# Patient Record
Sex: FEMALE | URBAN - METROPOLITAN AREA
[De-identification: names, ages, dates, MRNs, and addresses within clinical notes are randomized per-mention and may not be internally consistent; named-entity substitution may affect disease eponyms.]

---

## 2020-04-10 ENCOUNTER — EMERGENCY (EMERGENCY)
Facility: HOSPITAL | Age: 40
LOS: 1 days | Discharge: HOME | End: 2020-04-10
Attending: EMERGENCY MEDICINE
Payer: COMMERCIAL

## 2020-04-10 VITALS
SYSTOLIC BLOOD PRESSURE: 127 MMHG | RESPIRATION RATE: 18 BRPM | HEART RATE: 92 BPM | WEIGHT: 126.99 LBS | OXYGEN SATURATION: 98 % | HEIGHT: 62 IN | TEMPERATURE: 98 F | DIASTOLIC BLOOD PRESSURE: 84 MMHG

## 2020-04-10 PROCEDURE — 99282 EMERGENCY DEPT VISIT SF MDM: CPT

## 2020-04-10 PROCEDURE — 99284 EMERGENCY DEPT VISIT MOD MDM: CPT

## 2020-04-10 NOTE — ED PROVIDER NOTE - OBJECTIVE STATEMENT
40 y/o female with no sign medical hx presents to the ED c/o needle stick to the finger left thumb that occurred 2 hours prior. Pt immediately washed out the area. Source patient was discharge but identified. Pt is up to date with hep B, and tetanus. Does not want post exposure Prophylaxis at this time.

## 2020-04-10 NOTE — ED PROVIDER NOTE - CLINICAL SUMMARY MEDICAL DECISION MAKING FREE TEXT BOX
38 y/o female with no sign medical hx presents to the ED c/o needle stick to the finger that occurred 2 hours prior. pt cleaned out wound prior coming to the ED, up to date with tetanus and hep b. Source patient was identified but was discharge, paper work filled out, pt refusing PEP prophylaxis at this time, advised to follow up with PCP or health services

## 2020-04-10 NOTE — ED PROVIDER NOTE - ATTENDING CONTRIBUTION TO CARE
40yo F p/w needlestick injury, refused PREP, packet filled out and labs sent, [place]. Blanca Antonio DO

## 2020-04-10 NOTE — ED PROVIDER NOTE - PATIENT PORTAL LINK FT
You can access the FollowMyHealth Patient Portal offered by [facility] by registering at the following website: [email or web address]. By joining [facility]’s FollowMyHealth portal, you will also be able to view your health information using other applications (apps) compatible with our system.

## 2020-04-10 NOTE — ED PROVIDER NOTE - CHPI ED SYMPTOMS NEG
no redness/no drainage/no fever/no bleeding/no bleeding at site/no chills/no inflammation/no purulent drainage/no pain/no red streaks

## 2020-04-26 ENCOUNTER — MESSAGE (OUTPATIENT)
Age: 40
End: 2020-04-26

## 2020-04-29 ENCOUNTER — TRANSCRIPTION ENCOUNTER (OUTPATIENT)
Age: 40
End: 2020-04-29

## 2020-04-29 ENCOUNTER — APPOINTMENT (OUTPATIENT)
Dept: DISASTER EMERGENCY | Facility: CLINIC | Age: 40
End: 2020-04-29

## 2020-04-29 PROBLEM — Z00.00 ENCOUNTER FOR PREVENTIVE HEALTH EXAMINATION: Status: ACTIVE | Noted: 2020-04-29

## 2020-04-29 LAB
SARS-COV-2 IGG SERPL IA-ACNC: <0.1 INDEX
SARS-COV-2 IGG SERPL QL IA: NEGATIVE

## 2020-07-20 ENCOUNTER — OUTPATIENT (OUTPATIENT)
Dept: OUTPATIENT SERVICES | Facility: HOSPITAL | Age: 40
LOS: 1 days | End: 2020-07-20
Payer: SELF-PAY

## 2020-07-20 VITALS
OXYGEN SATURATION: 100 % | WEIGHT: 128.09 LBS | HEIGHT: 61 IN | SYSTOLIC BLOOD PRESSURE: 119 MMHG | DIASTOLIC BLOOD PRESSURE: 79 MMHG | RESPIRATION RATE: 16 BRPM | TEMPERATURE: 98 F | HEART RATE: 64 BPM

## 2020-07-20 DIAGNOSIS — Z01.818 ENCOUNTER FOR OTHER PREPROCEDURAL EXAMINATION: ICD-10-CM

## 2020-07-20 DIAGNOSIS — L98.8 OTHER SPECIFIED DISORDERS OF THE SKIN AND SUBCUTANEOUS TISSUE: ICD-10-CM

## 2020-07-20 DIAGNOSIS — Z98.890 OTHER SPECIFIED POSTPROCEDURAL STATES: Chronic | ICD-10-CM

## 2020-07-20 LAB
ALBUMIN SERPL ELPH-MCNC: 4 G/DL — SIGNIFICANT CHANGE UP (ref 3.3–5)
ALP SERPL-CCNC: 40 U/L — SIGNIFICANT CHANGE UP (ref 40–120)
ALT FLD-CCNC: 18 U/L — SIGNIFICANT CHANGE UP (ref 12–78)
ANION GAP SERPL CALC-SCNC: 1 MMOL/L — LOW (ref 5–17)
AST SERPL-CCNC: 14 U/L — LOW (ref 15–37)
BILIRUB SERPL-MCNC: 0.4 MG/DL — SIGNIFICANT CHANGE UP (ref 0.2–1.2)
BUN SERPL-MCNC: 7 MG/DL — SIGNIFICANT CHANGE UP (ref 7–23)
CALCIUM SERPL-MCNC: 9.3 MG/DL — SIGNIFICANT CHANGE UP (ref 8.5–10.1)
CHLORIDE SERPL-SCNC: 108 MMOL/L — SIGNIFICANT CHANGE UP (ref 96–108)
CO2 SERPL-SCNC: 32 MMOL/L — HIGH (ref 22–31)
CREAT SERPL-MCNC: 0.69 MG/DL — SIGNIFICANT CHANGE UP (ref 0.5–1.3)
GLUCOSE SERPL-MCNC: 87 MG/DL — SIGNIFICANT CHANGE UP (ref 70–99)
HCG SERPL-ACNC: <1 MIU/ML — SIGNIFICANT CHANGE UP
HCT VFR BLD CALC: 41 % — SIGNIFICANT CHANGE UP (ref 34.5–45)
HGB BLD-MCNC: 13.7 G/DL — SIGNIFICANT CHANGE UP (ref 11.5–15.5)
MCHC RBC-ENTMCNC: 30.9 PG — SIGNIFICANT CHANGE UP (ref 27–34)
MCHC RBC-ENTMCNC: 33.4 GM/DL — SIGNIFICANT CHANGE UP (ref 32–36)
MCV RBC AUTO: 92.3 FL — SIGNIFICANT CHANGE UP (ref 80–100)
NRBC # BLD: 0 /100 WBCS — SIGNIFICANT CHANGE UP (ref 0–0)
PLATELET # BLD AUTO: 430 K/UL — HIGH (ref 150–400)
POTASSIUM SERPL-MCNC: 4.4 MMOL/L — SIGNIFICANT CHANGE UP (ref 3.5–5.3)
POTASSIUM SERPL-SCNC: 4.4 MMOL/L — SIGNIFICANT CHANGE UP (ref 3.5–5.3)
PROT SERPL-MCNC: 7.7 G/DL — SIGNIFICANT CHANGE UP (ref 6–8.3)
RBC # BLD: 4.44 M/UL — SIGNIFICANT CHANGE UP (ref 3.8–5.2)
RBC # FLD: 12.5 % — SIGNIFICANT CHANGE UP (ref 10.3–14.5)
SODIUM SERPL-SCNC: 141 MMOL/L — SIGNIFICANT CHANGE UP (ref 135–145)
WBC # BLD: 7.89 K/UL — SIGNIFICANT CHANGE UP (ref 3.8–10.5)
WBC # FLD AUTO: 7.89 K/UL — SIGNIFICANT CHANGE UP (ref 3.8–10.5)

## 2020-07-20 PROCEDURE — 86901 BLOOD TYPING SEROLOGIC RH(D): CPT

## 2020-07-20 PROCEDURE — 86900 BLOOD TYPING SEROLOGIC ABO: CPT

## 2020-07-20 PROCEDURE — 85027 COMPLETE CBC AUTOMATED: CPT

## 2020-07-20 PROCEDURE — 86850 RBC ANTIBODY SCREEN: CPT

## 2020-07-20 PROCEDURE — 80053 COMPREHEN METABOLIC PANEL: CPT

## 2020-07-20 PROCEDURE — G0463: CPT

## 2020-07-20 PROCEDURE — 84702 CHORIONIC GONADOTROPIN TEST: CPT

## 2020-07-20 PROCEDURE — 36415 COLL VENOUS BLD VENIPUNCTURE: CPT

## 2020-07-20 NOTE — H&P PST ADULT - NSANTHOSAYNRD_GEN_A_CORE
No. ISIDRO screening performed.  STOP BANG Legend: 0-2 = LOW Risk; 3-4 = INTERMEDIATE Risk; 5-8 = HIGH Risk

## 2020-07-20 NOTE — H&P PST ADULT - ATTENDING COMMENTS
Reviewed all laboratory. Slightly increased PLT count. No change from previous labs. Patient without bleeding history. Previous breast augmentation surgery without complications. Risks, benefits, and alternatives explained in detail.

## 2020-07-20 NOTE — H&P PST ADULT - NSICDXPASTMEDICALHX_GEN_ALL_CORE_FT
PAST MEDICAL HISTORY:  No pertinent past medical history PAST MEDICAL HISTORY:  Other specified disorders of the skin and subcutaneous tissue

## 2020-07-20 NOTE — H&P PST ADULT - HISTORY OF PRESENT ILLNESS
32 yo female scheduled for Abdominoplasty on 7/23/20 with Dr Dupree.  Patient is having surgery for cosmetic reasons

## 2020-07-20 NOTE — H&P PST ADULT - NSICDXPROBLEM_GEN_ALL_CORE_FT
PROBLEM DIAGNOSES  Problem: Other specified disorders of the skin and subcutaneous tissue  Assessment and Plan: check labs cbc cmp hcg  medical clearance  hibiclens x 3 days with written and verbal instructions givne  patient hasstopped multivitamin  no medications the morning of surgery  patient is aware that she will be scheduled for appointment for Covid Testing at [facility]   patient verbalizes understanding of instructions

## 2020-07-21 ENCOUNTER — OUTPATIENT (OUTPATIENT)
Dept: OUTPATIENT SERVICES | Facility: HOSPITAL | Age: 40
LOS: 1 days | End: 2020-07-21
Payer: COMMERCIAL

## 2020-07-21 DIAGNOSIS — Z98.890 OTHER SPECIFIED POSTPROCEDURAL STATES: Chronic | ICD-10-CM

## 2020-07-21 DIAGNOSIS — Z11.59 ENCOUNTER FOR SCREENING FOR OTHER VIRAL DISEASES: ICD-10-CM

## 2020-07-21 PROCEDURE — U0003: CPT

## 2020-07-22 ENCOUNTER — TRANSCRIPTION ENCOUNTER (OUTPATIENT)
Age: 40
End: 2020-07-22

## 2020-07-22 LAB — SARS-COV-2 RNA SPEC QL NAA+PROBE: SIGNIFICANT CHANGE UP

## 2020-07-23 ENCOUNTER — OUTPATIENT (OUTPATIENT)
Dept: OUTPATIENT SERVICES | Facility: HOSPITAL | Age: 40
LOS: 1 days | End: 2020-07-23
Payer: SELF-PAY

## 2020-07-23 ENCOUNTER — RESULT REVIEW (OUTPATIENT)
Age: 40
End: 2020-07-23

## 2020-07-23 VITALS
SYSTOLIC BLOOD PRESSURE: 103 MMHG | OXYGEN SATURATION: 98 % | RESPIRATION RATE: 14 BRPM | DIASTOLIC BLOOD PRESSURE: 68 MMHG | HEART RATE: 66 BPM

## 2020-07-23 VITALS
OXYGEN SATURATION: 100 % | WEIGHT: 128.09 LBS | SYSTOLIC BLOOD PRESSURE: 109 MMHG | DIASTOLIC BLOOD PRESSURE: 72 MMHG | HEIGHT: 61 IN | RESPIRATION RATE: 14 BRPM | TEMPERATURE: 98 F | HEART RATE: 63 BPM

## 2020-07-23 DIAGNOSIS — Z01.818 ENCOUNTER FOR OTHER PREPROCEDURAL EXAMINATION: ICD-10-CM

## 2020-07-23 DIAGNOSIS — L98.8 OTHER SPECIFIED DISORDERS OF THE SKIN AND SUBCUTANEOUS TISSUE: ICD-10-CM

## 2020-07-23 DIAGNOSIS — Z98.890 OTHER SPECIFIED POSTPROCEDURAL STATES: Chronic | ICD-10-CM

## 2020-07-23 PROCEDURE — 88300 SURGICAL PATH GROSS: CPT

## 2020-07-23 PROCEDURE — 15830 EXC EXCESSIVE SKIN ABDOMEN: CPT

## 2020-07-23 PROCEDURE — 15847 EXC SKIN ABD ADD-ON: CPT

## 2020-07-23 PROCEDURE — 88300 SURGICAL PATH GROSS: CPT | Mod: 26

## 2020-07-23 RX ORDER — HYDROMORPHONE HYDROCHLORIDE 2 MG/ML
1 INJECTION INTRAMUSCULAR; INTRAVENOUS; SUBCUTANEOUS
Refills: 0 | Status: DISCONTINUED | OUTPATIENT
Start: 2020-07-23 | End: 2020-07-23

## 2020-07-23 RX ORDER — BUPIVACAINE 13.3 MG/ML
20 INJECTION, SUSPENSION, LIPOSOMAL INFILTRATION ONCE
Refills: 0 | Status: DISCONTINUED | OUTPATIENT
Start: 2020-07-23 | End: 2020-08-07

## 2020-07-23 RX ORDER — SODIUM CHLORIDE 9 MG/ML
1000 INJECTION, SOLUTION INTRAVENOUS
Refills: 0 | Status: DISCONTINUED | OUTPATIENT
Start: 2020-07-23 | End: 2020-07-23

## 2020-07-23 RX ORDER — ONDANSETRON 8 MG/1
4 TABLET, FILM COATED ORAL ONCE
Refills: 0 | Status: COMPLETED | OUTPATIENT
Start: 2020-07-23 | End: 2020-07-23

## 2020-07-23 RX ORDER — CEFAZOLIN SODIUM 1 G
2000 VIAL (EA) INJECTION ONCE
Refills: 0 | Status: COMPLETED | OUTPATIENT
Start: 2020-07-23 | End: 2020-07-23

## 2020-07-23 RX ORDER — HYDROMORPHONE HYDROCHLORIDE 2 MG/ML
0.5 INJECTION INTRAMUSCULAR; INTRAVENOUS; SUBCUTANEOUS
Refills: 0 | Status: DISCONTINUED | OUTPATIENT
Start: 2020-07-23 | End: 2020-07-23

## 2020-07-23 RX ADMIN — ONDANSETRON 4 MILLIGRAM(S): 8 TABLET, FILM COATED ORAL at 12:48

## 2020-07-23 RX ADMIN — SODIUM CHLORIDE 75 MILLILITER(S): 9 INJECTION, SOLUTION INTRAVENOUS at 06:50

## 2020-07-23 RX ADMIN — HYDROMORPHONE HYDROCHLORIDE 0.5 MILLIGRAM(S): 2 INJECTION INTRAMUSCULAR; INTRAVENOUS; SUBCUTANEOUS at 13:29

## 2020-07-23 RX ADMIN — SODIUM CHLORIDE 75 MILLILITER(S): 9 INJECTION, SOLUTION INTRAVENOUS at 12:51

## 2020-07-23 RX ADMIN — Medication 200 MILLIGRAM(S): at 13:39

## 2020-07-23 RX ADMIN — HYDROMORPHONE HYDROCHLORIDE 0.5 MILLIGRAM(S): 2 INJECTION INTRAMUSCULAR; INTRAVENOUS; SUBCUTANEOUS at 13:40

## 2020-07-23 NOTE — ASU DISCHARGE PLAN (ADULT/PEDIATRIC) - ASU DC SPECIAL INSTRUCTIONSFT
Call Dr. Dupree's office for an appointment for follow up in Keep abdominal binder in place at all times  Keep dressings clean, dry and intact at all times  Empty and record BECKY drain output twice daily and bring measurements to follow up visit.  Call Dr. Dupree's office for an appointment for appointment for follow up on Monday 7/27/20  Take all medications as prescribed and complete antibiotic course as instructed.

## 2022-01-08 NOTE — ASU PATIENT PROFILE, ADULT - AS SC BRADEN SENSORY
[facility]  Hospitalist Discharge Summary      Date of Admission:  1/5/2022  Date of Discharge:  1/7/2022  3:47 PM  Discharging Provider: PAULINO POLLOCK MD      Discharge Diagnoses   Principal Problem:    Syncope and collapse  Active Problems:    Hypertension    Dehydration    Hypokalemia    Atrial fibrillation, unspecified type (H)    RON (acute kidney injury) (H)    Infection due to 2019 novel coronavirus       Discharge Procedure Orders   COVID-19 GetWell Loop Referral   Referral Priority: Routine: Next available opening Referral Type: Consultation   Number of Visits Requested: 1     Discharge Order: F/U with Cardiac  MAKAYLA   Standing Status: Future   Referral Priority: Routine: Next available opening Referral Type: Consultation   Number of Visits Requested: 1     Reason for your hospital stay   Order Comments: Atrial fibrillation, syncope     Follow-up and recommended labs and tests   Order Comments: Follow up with primary care provider, Cait Dempsey, within 7 days to evaluate medication change and for hospital follow- up.  The following labs/tests are recommended: Basic metabolic panel, CBC, magnesium.     Activity   Order Comments: Your activity upon discharge: activity as tolerated     Order Specific Question Answer Comments   Is discharge order? Yes      When to contact your care team   Order Comments: Call your primary doctor if you have any of the following: temperature greater than 100.5,  increased shortness of breath or increased pain.     Contact provider   Order Comments: Contact your primary care provider if If increased trouble breathing, new arm/leg swelling, dizziness/passing out, falls, bleeding that doesn't stop, or uncontrolled pain.     Discharge - Quarantine/Isolation Instruction   Order Comments: Date of symptom onset:     Date of first positive test:    If you tested positive COVID-19 and show symptoms (fever, cough, body aches or trouble breathing):        Stay  "home and away from others (self-isolate) until:        At least 10 days have passed since your symptoms started. AND...        You've had no fever-and no medicine that reduces fever for 1 full day (24 hours). AND...         Your other symptoms have resolved (gotten better).  If you tested positive for COVID-19 but don't show symptoms:       Stay home and away from others (self-isolate) until at least 10 days have passed since the date of your first positive COVID-19 test.     Discharge Instructions   Order Comments: Outpatient sleep study to be arranged by primary physician, ask primary to set up event monitor 30 day,follow-up with cardiology in 2 to 4 weeks     Diet   Order Comments: Follow this diet upon discharge: Orders Placed This Encounter      Combination Diet Regular Diet Adult       Order Specific Question Answer Comments   Is discharge order? Yes           Hospital Course     66-year-old female recently diagnosed with COVID with a past history of hypertension, dyslipidemia, palpitations/arrhythmia, mood disorder who was admitted for syncope , found to have acute kidney injury and A. fib with RVR     Syncope-  Likely orthostatic due to hypovolemia/acute kidney injury though tachycardia also may be playing a role  CT shows no PE  Treated with IV fluid, normal orthostatics after renal failure resolved     Paroxysmal atrial fibrillation with rapid ventricular response-  New diagnosis, however patient on propranolol with palpitations and \"arrhythmia\" though has never been told she is in atrial fibrillation.  Started metoprolol 12.5 mg twice a day instead of her home propranolol.  Change to metoprolol XL 1 2 5 mg daily  Converted to sinus rhythm  Cardiac echocardiogram unremarkable  [id number] score of 3, given Covid and increased risk of clots and stroke we will start her on Eliquis, patient understands risk of bleeding and benefits of anticoagulation.   Follow-up with cardiology as outpatient, recommend " outpatient 30-day ACT monitor as it is unclear if this is a chronic issue or a one-time event due to stress of Covid.  Her history of arrhythmias though makes me think that she may have this underlying issue chronically.  Outpatient sleep study     Covid-19 infection  Symptom onset and positive antigen test on 1/1.  Vaccinated but not boosted.  No hypoxia, CT shows no pulmonary embolism, small focus of mucous plugging as well as mild diffuse bronchial wall thickening likely bronchitis.  Normal procalcitonin.  No indication for remdesivir  According to admission notes have mild bronchospasm on exam, dexamethasone started, also treated with Xopenex inhaler with resolution of bronchospasm.   Continue dexamethasone for total 7-day course, Xopenex inhaler as needed     Mild elevated CK-resolved, restart statin     Acute kidney injury-  Likely secondary to Covid and volume depletion and home diuretic..  CK mildly elevated, not enough to cause rhabdomyolysis  Resolved with IV fluid bolus  UA unremarkable  Discontinue IV fluid and Maxide     Hypokalemia-replace per protocol, still had intermittent hypokalemia.  Started on low-dose potassium, recheck as outpatient.  Magnesium normal.    Essential hypertension-  Stable.  Maxide held due to acute kidney injury, metoprolol started, blood pressure is controlled without Maxide at discharge.    Consultations This Hospital Stay   PHYSICAL THERAPY ADULT IP CONSULT  SOCIAL WORK IP CONSULT    Code Status   Prior         Greater than 35 minutes spent on coordinating discharge, evaluating labs, studies, evaluating patient, evaluating specialist notes    PAULINO POLLOCK MD  [facility]  [address] [place] [id number]  Phone: [phone]  Fax: [phone]  ______________________________________________________________________         Primary Care Physician   Cait Dempsey    Discharge Orders      COVID-19 GetWell Loop Referral       Discharge Order: F/U with Cardiac  MAKAYLA      Reason for your hospital stay    Atrial fibrillation, syncope     Follow-up and recommended labs and tests    Follow up with primary care provider, Cait Dempsey, within 7 days to evaluate medication change and for hospital follow- up.  The following labs/tests are recommended: Basic metabolic panel, CBC, magnesium.     Activity    Your activity upon discharge: activity as tolerated     When to contact your care team    Call your primary doctor if you have any of the following: temperature greater than 100.5,  increased shortness of breath or increased pain.     Contact provider    Contact your primary care provider if If increased trouble breathing, new arm/leg swelling, dizziness/passing out, falls, bleeding that doesn't stop, or uncontrolled pain.     Discharge - Quarantine/Isolation Instruction    Date of symptom onset:     Date of first positive test:    If you tested positive COVID-19 and show symptoms (fever, cough, body aches or trouble breathing):        Stay home and away from others (self-isolate) until:        At least 10 days have passed since your symptoms started. AND...        You've had no fever-and no medicine that reduces fever for 1 full day (24 hours). AND...         Your other symptoms have resolved (gotten better).  If you tested positive for COVID-19 but don't show symptoms:       Stay home and away from others (self-isolate) until at least 10 days have passed since the date of your first positive COVID-19 test.     Discharge Instructions    Outpatient sleep study to be arranged by primary physician, ask primary to set up event monitor 30 day,follow-up with cardiology in 2 to 4 weeks     Diet    Follow this diet upon discharge: Orders Placed This Encounter      Combination Diet Regular Diet Adult         Significant Results and Procedures   Most Recent 3 CBC's:  Recent Labs   Lab Test 01/07/22  0457 01/06/22  0746 01/05/22  1836   WBC 6.9 7.4  10.0   HGB 10.8* 13.4 15.6    100 98    182 207     Most Recent 3 BMP's:  Recent Labs   Lab Test 01/07/22  1108 01/07/22  0457 01/06/22  1911 01/06/22  0746 01/06/22  0031 01/05/22  1836   NA  --  142  --  136  --  137   POTASSIUM 4.0 3.3* 4.1 3.4*   < > 3.0*   CHLORIDE  --  102  --  97*  --  91*   CO2  --  27  --  28  --  30   BUN  --  24*  --  17  --  22   CR  --  0.78  --  0.82  --  1.41*   ANIONGAP  --  13  --  11  --  16   THALIA  --  8.8  --  8.8  --  10.0   GLC  --  101  --  133*  --  152*    < > = values in this interval not displayed.     Most Recent 2 LFT's:  Recent Labs   Lab Test 01/06/22  0746 01/05/22  1836   AST 28 39   ALT 19 22   ALKPHOS 75 92   BILITOTAL 0.2 0.3   ,   Results for orders placed or performed during the hospital encounter of 01/05/22   CT Chest Pulmonary Embolism w Contrast    Narrative    EXAM: CT CHEST PULMONARY EMBOLISM W CONTRAST  LOCATION: [facility]  DATE/TIME: 1/5/2022 7:57 PM    INDICATION: PE suspected, low/intermediate prob, positive D-dimer  COMPARISON: CT abdomen/pelvis 04/25/2015  TECHNIQUE: CT chest pulmonary angiogram during arterial phase injection of IV contrast. Multiplanar reformats and MIP reconstructions were performed. Dose reduction techniques were used.   CONTRAST: isovue 370 75ml    FINDINGS:  ANGIOGRAM CHEST: Pulmonary arteries are normal caliber and negative for pulmonary emboli. Thoracic aorta is negative for dissection. No CT evidence of right heart strain.    LUNGS AND PLEURA: Mild emphysema. Mild dependent atelectasis. No pleural effusion. Mild diffuse bronchial wall thickening. Small foci of mucus plugging.     MEDIASTINUM/AXILLAE: Normal.    CORONARY ARTERY CALCIFICATION: None.    UPPER ABDOMEN: Normal.    MUSCULOSKELETAL: Mild degenerative changes of the spine. Osseous demineralization.      Impression    IMPRESSION:  1.  No pulmonary embolus.  2.  Emphysema.  3.  Mild diffuse bronchial wall thickening.  4.  Small  (4) no impairment foci of mucus plugging.   Echocardiogram Complete     Value    LVEF  60-65%    [facility]    [id number]  [id number]  [id number]  [id number]^NUNU^HCELI^[place] [address]     Name: CHIDI DEL TORO  MRN: [id number]  : 1955  Study Date: 2022 10:44 AM  Age: 66 yrs  Gender: Female  Patient Location: [facility]  Reason For Study: Atrial Fibrillation  Ordering Physician: CHELI EDDY  Performed By:      BSA: 1.8 m2  Height: 64 in  Weight: 158 lb  HR: 66  ______________________________________________________________________________  ______________________________________________________________________________  Interpretation Summary     1. Left ventricular size, wall motion and function are normal. The ejection  fraction is 60-65%. No regional wall motion abnormalities noted.  2. Normal right ventricle size and systolic function.  3, There is mild to moderate (1-2+) tricuspid regurgitation. Normal RA  pressure of 3 mmHg.  4. There is mild (1+) aortic regurgitation.  ______________________________________________________________________________  Left Ventricle  Left ventricular size, wall motion and function are normal. The ejection  fraction is 60-65%. There is normal left ventricular wall thickness. Left  ventricular diastolic function is normal. No regional wall motion  abnormalities noted.     Right Ventricle  Normal right ventricle size and systolic function.     Atria  The left atrium is mildly dilated. Right atrial size is normal. There is no  color Doppler evidence of an atrial shunt.     Mitral Valve  Mitral valve leaflets appear normal. There is no evidence of mitral stenosis  or clinically significant mitral regurgitation. There is trace mitral  regurgitation.     Tricuspid Valve  The right ventricular systolic pressure is approximated at 24.4 mmHg plus the  right atrial pressure. IVC diameter <2.1 cm collapsing >50% with sniff  suggests a normal RA  pressure of 3 mmHg. There is mild to moderate (1-2+)  tricuspid regurgitation.     Aortic Valve  Aortic valve leaflets appear normal. There is mild (1+) aortic regurgitation.  No aortic stenosis is present.     Pulmonic Valve  The pulmonic valve is not well seen, but is grossly normal. This degree of  valvular regurgitation is within normal limits.     Vessels  The aorta root is normal. Normal size ascending aorta. IVC diameter <2.1 cm  collapsing >50% with sniff suggests a normal RA pressure of 3 mmHg.     Pericardium  Trivial pericardial effusion.     ______________________________________________________________________________  MMode/2D Measurements & Calculations  IVSd: 0.91 cm  LVIDd: 5.1 cm  LVIDs: 3.2 cm  LVPWd: 0.95 cm     FS: 36.5 %  LV mass(C)d: 170.4 grams  LV mass(C)dI: 96.3 grams/m2  Ao root diam: 3.1 cm  LA dimension: 3.8 cm  asc Aorta Diam: 3.4 cm  LA/Ao: 1.2  LVOT diam: 2.0 cm  LVOT area: 3.0 cm2  LA Volume Indexed (AL/bp): 33.5 ml/m2  RWT: 0.38     Doppler Measurements & Calculations  MV E max juan j: 91.2 cm/sec  MV A max juan j: 67.4 cm/sec  MV E/A: 1.4     MV dec slope: 563.0 cm/sec2  MV dec time: 0.16 sec  Ao V2 max: 153.2 cm/sec  Ao max P.0 mmHg  Ao V2 mean: 102.3 cm/sec  Ao mean P.7 mmHg  Ao V2 VTI: 32.1 cm  CHARITY(I,D): 2.1 cm2  CHARITY(V,D): 1.9 cm2  AI P1/2t: 464.4 msec  LV V1 max PG: 3.9 mmHg  LV V1 max: 99.0 cm/sec  LV V1 VTI: 22.2 cm  SV(LVOT): 66.9 ml  SI(LVOT): 37.8 ml/m2  PA acc time: 0.11 sec  TR max juan j: 247.0 cm/sec  TR max P.4 mmHg  AV Juan J Ratio (DI): 0.65  CHARITY Index (cm2/m2): 1.2  E/E' av.3  Lateral E/e': 11.4  Medial E/e': 15.2     ______________________________________________________________________________  Report approved by: Itzel Reyes 2022 12:12 PM               Discharge Medications   Discharge Medication List as of 2022  3:01 PM      START taking these medications    Details   acetaminophen (TYLENOL) 325 MG tablet Take 2 tablets (650 mg) by  mouth every 4 hours as needed for mild pain or fever, R-0, No Print Out      dexamethasone (DECADRON) 6 MG tablet Take 1 tablet (6 mg) by mouth daily (with breakfast), Disp-4 tablet, R-0, E-Prescribe      potassium chloride ER (KLOR-CON M) 10 MEQ CR tablet Take 1 tablet (10 mEq) by mouth daily, Disp-30 tablet, R-0, E-Prescribe         CONTINUE these medications which have CHANGED    Details   apixaban ANTICOAGULANT (ELIQUIS) 5 MG tablet Take 1 tablet (5 mg) by mouth 2 times daily x, Disp-60 tablet, R-0, E-Prescribe      levalbuterol (XOPENEX HFA) 45 MCG/ACT inhaler Inhale 2 puffs into the lungs every 4 hours as needed for shortness of breath / dyspnea or wheezing (x), Disp-15 g, R-0, E-Prescribe      metoprolol succinate ER (TOPROL-XL) 25 MG 24 hr tablet Take 1 tablet (25 mg) by mouth daily, Disp-30 tablet, R-0, E-Prescribex         CONTINUE these medications which have NOT CHANGED    Details   ALPRAZolam (XANAX) 0.5 MG tablet Take 1 tablet (0.5 mg) by mouth nightly as needed for anxiety Use half tablet as needed.  Total #15, Disp-15 tablet, R-0, E-Prescribe      atorvastatin (LIPITOR) 80 MG tablet Take 1 tablet (80 mg) by mouth At Bedtime, Disp-90 tablet, R-2, E-Prescribe      mupirocin (BACTROBAN) 2 % external ointment Apply topically 3 times dailyDisp-15 g, [id number]      sertraline (ZOLOFT) 25 MG tablet Take 1 tablet (25 mg) by mouth daily, Disp-90 tablet, R-2, E-Prescribe      triamcinolone (KENALOG) 0.5 % external ointment Apply 1 g topically 2 times daily On the lips for 1 week, Disp-15 g, R-1, E-Prescribe         STOP taking these medications       Aspirin-Caffeine 400-32 MG TABS Comments:   Reason for Stopping:         ibuprofen/diphenhydramine cit (ADVIL PM ORAL) Comments:   Reason for Stopping:         propranolol ER (INDERAL LA) 60 MG 24 hr capsule Comments:   Reason for Stopping:         triamterene-HCTZ (DYAZIDE) 37.5-25 MG capsule Comments:   Reason for Stopping:             Allergies   No Known  "Allergies    Physical Exam:       /62 (BP Location: Left arm)   Pulse 66   Temp 98.6  F (37  C) (Oral)   Resp 18   Ht 1.664 m (5' 5.5\")   Wt 71 kg (156 lb 8 oz)   SpO2 97%   BMI 25.65 kg/m    General appearance: alert, appears stated age and cooperative  Head: Normocephalic, without obvious abnormality, atraumatic  Eyes: Clear conjuctiva  Neck: no JVD and supple, symmetrical, trachea midline  Lungs: clear to auscultation bilaterally  Heart: regular rate and rhythm, S1, S2 normal, no murmur, click, rub or gallop  Abdomen: soft, non-tender; bowel sounds normal; no masses,  no organomegaly  Extremities: Vinny's sign is negative, no sign of DVT  Skin: Skin color, texture, turgor normal. No rashes or lesions  Neurologic: Grossly normal        "

## 2022-02-11 ENCOUNTER — TRANSCRIPTION ENCOUNTER (OUTPATIENT)
Age: 42
End: 2022-02-11

## 2024-01-11 NOTE — ASU PATIENT PROFILE, ADULT - PATIENT KNOW
IV removed and DSD applied to site. Patient verbalized understanding of discharge instructions and follow up care. Patient independently dressed. Taken to the lobby via wheelchair.    yes

## 2024-02-27 PROBLEM — L98.8 OTHER SPECIFIED DISORDERS OF THE SKIN AND SUBCUTANEOUS TISSUE: Chronic | Status: ACTIVE | Noted: 2020-07-20

## 2024-06-10 ENCOUNTER — NON-APPOINTMENT (OUTPATIENT)
Age: 44
End: 2024-06-10

## 2024-06-13 ENCOUNTER — APPOINTMENT (OUTPATIENT)
Dept: ORTHOPEDIC SURGERY | Facility: CLINIC | Age: 44
End: 2024-06-13
Payer: OTHER MISCELLANEOUS

## 2024-06-13 VITALS
DIASTOLIC BLOOD PRESSURE: 81 MMHG | HEIGHT: 62 IN | HEART RATE: 84 BPM | BODY MASS INDEX: 23 KG/M2 | SYSTOLIC BLOOD PRESSURE: 114 MMHG | WEIGHT: 125 LBS

## 2024-06-13 DIAGNOSIS — Z86.2 PERSONAL HISTORY OF DISEASES OF THE BLOOD AND BLOOD-FORMING ORGANS AND CERTAIN DISORDERS INVOLVING THE IMMUNE MECHANISM: ICD-10-CM

## 2024-06-13 PROCEDURE — 99204 OFFICE O/P NEW MOD 45 MIN: CPT

## 2024-06-13 NOTE — PHYSICAL EXAM
[de-identified] : CONSTITUTIONAL: Patient is a very pleasant individual who is well-nourished and appears stated age. PSYCHIATRIC: Alert and oriented times three and in no apparent distress, and participates with orthopedic evaluation well. HEAD: Atraumatic and nonsyndromic in appearance. EENT: No thyromegaly, EOMI. RESPIRATORY: Respiratory rate is regular, not dyspneic on examination. LYMPHATICS: There is no cervical or axillary lymphadenopathy. INTEGUMENTARY: Skin is clean, dry, and intact about the bilateral upper extremities and cervical spine. VASCULAR: There is brisk capillary refill about the bilateral upper extremities and radial pulses are 2/4.  Cervical Spine Exam:  Shoulder Abduction (C5): 5/5 B/L Biceps (C6): 5/5 B/L Wrist Extension (C6): 5/5 B/L Triceps (C7): 5/5 B/L Wrist Flexion (C7): 5/5 B/L Finger Adduction/Abduction (C8/T1): 5/5 B/L  Right shoulder exam: Tenderness along lateral deltoid Active range of motion abduction to about 45 degrees forward flexion to about 45 degrees until discomfort is felt Passive range of motion to almost 90 degrees of forward flexion abduction until discomfort is felt Positive impingement signs with right shoulder abduction external rotation Unable to appreciate any significant rotator cuff weakness given her symptoms and pain  Gait: Normal gait w/o assistance Able to perform tandem gait Able to Heel Walk Able to Toe Walk  Special Testing:  Negative Spurling's negative clonus BL LE negative Hoffmans B/L UE   [de-identified] : 3 views right shoulder performed in [facility] PACS on 6/11/2024 demonstrates no fractures or dislocations no significant glenohumeral joint arthritis there is well-maintained subacromial joint space

## 2024-06-13 NOTE — REASON FOR VISIT
[Initial Visit] : an initial visit for [Workers' Comp: Date of Injury: _______] : This visit is related to worker's compensation. Date of Injury: [unfilled] [FreeTextEntry2] : right shoulder pain

## 2024-06-13 NOTE — ASSESSMENT
[FreeTextEntry1] : 44-year-old female with right shoulder subacromial bursitis possible rotator cuff injury  I discussed with Enma that I believe her symptoms are likely related to a subacromial bursitis and this usually responds well to anti-inflammatory medications possible injections and physical therapy.  I was unable to obtain a full exam of her rotator cuff given the severity of her symptoms and there is a possibility she has a rotator cuff injury but I also discussed with her that initial treatment for a rotator cuff injury would be conservative management with medications and therapy. For work on recommending that she refrain she will be unable to perform her full duties and will have significant restrictions as she is right-hand dominant She was prescribed a Medrol Dosepak by the urgent care and I recommended that she will take this and I believe this will help with her symptoms Also recommending physical therapy 2-3 times per week for 6 to 8 weeks for her right shoulder [facility] service referral was placed to assist with scheduling and location I will see her back in 2 weeks if her symptoms or not improving we will consider a subacromial injection versus more advanced imaging

## 2024-06-13 NOTE — HISTORY OF PRESENT ILLNESS
[de-identified] : Chief Complaint: Right shoulder pain   History of Present Illness: 44-year-old female presenting today for a right shoulder injury at work.  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  She states that she was at the heavy patients on 6/6/2024 and developed severe right shoulder pain and over the past several days she is having decreased ability to lift the right arm secondary to discomfort.  She states that the pain is aching in nature isolated to the right shoulder deltoid region with some radiation into the neck but feels the main pain is stemming from the posterior and lateral shoulder rating down the lateral deltoid.  She having difficulty with raising her right arm she is right-hand dominant.  Rates pain about a 6 out of 10 in severity worse with use she has tried some over-the-counter anti-inflammatories which provide short-term relief of her symptoms.  She denies any numbness or tingling rating down into her hand.  She denies any significant weakness in her right arm however she has decreased use because of pain.   Past medical history, past surgical history, medications, allergies, social history, and family history are as documented in our records today.  Notable items include: None   Review of Systems: I have reviewed the patient's documented Review of Systems data today, I concur with this documentation.

## 2024-06-28 ENCOUNTER — APPOINTMENT (OUTPATIENT)
Dept: ORTHOPEDIC SURGERY | Facility: CLINIC | Age: 44
End: 2024-06-28
Payer: OTHER MISCELLANEOUS

## 2024-06-28 VITALS
SYSTOLIC BLOOD PRESSURE: 115 MMHG | WEIGHT: 125 LBS | HEART RATE: 78 BPM | BODY MASS INDEX: 23 KG/M2 | DIASTOLIC BLOOD PRESSURE: 82 MMHG | HEIGHT: 62 IN

## 2024-06-28 DIAGNOSIS — Z83.3 FAMILY HISTORY OF DIABETES MELLITUS: ICD-10-CM

## 2024-06-28 DIAGNOSIS — Z78.9 OTHER SPECIFIED HEALTH STATUS: ICD-10-CM

## 2024-06-28 DIAGNOSIS — M75.51 BURSITIS OF RIGHT SHOULDER: ICD-10-CM

## 2024-06-28 DIAGNOSIS — M25.511 PAIN IN RIGHT SHOULDER: ICD-10-CM

## 2024-06-28 PROCEDURE — 99214 OFFICE O/P EST MOD 30 MIN: CPT | Mod: 25

## 2024-06-28 PROCEDURE — 20610 DRAIN/INJ JOINT/BURSA W/O US: CPT | Mod: RT

## 2024-07-16 ENCOUNTER — APPOINTMENT (OUTPATIENT)
Dept: GASTROENTEROLOGY | Facility: CLINIC | Age: 44
End: 2024-07-16
Payer: COMMERCIAL

## 2024-07-16 VITALS
OXYGEN SATURATION: 98 % | SYSTOLIC BLOOD PRESSURE: 116 MMHG | DIASTOLIC BLOOD PRESSURE: 87 MMHG | BODY MASS INDEX: 23.37 KG/M2 | HEART RATE: 93 BPM | HEIGHT: 62 IN | RESPIRATION RATE: 16 BRPM | WEIGHT: 127 LBS

## 2024-07-16 DIAGNOSIS — Z86.2 PERSONAL HISTORY OF DISEASES OF THE BLOOD AND BLOOD-FORMING ORGANS AND CERTAIN DISORDERS INVOLVING THE IMMUNE MECHANISM: ICD-10-CM

## 2024-07-16 DIAGNOSIS — R13.19 OTHER DYSPHAGIA: ICD-10-CM

## 2024-07-16 DIAGNOSIS — M25.511 PAIN IN RIGHT SHOULDER: ICD-10-CM

## 2024-07-16 DIAGNOSIS — Z78.9 OTHER SPECIFIED HEALTH STATUS: ICD-10-CM

## 2024-07-16 DIAGNOSIS — R79.0 ABNORMAL LVL OF BLOOD MINERAL: ICD-10-CM

## 2024-07-16 DIAGNOSIS — Z83.3 FAMILY HISTORY OF DIABETES MELLITUS: ICD-10-CM

## 2024-07-16 PROCEDURE — 99204 OFFICE O/P NEW MOD 45 MIN: CPT

## 2024-07-16 RX ORDER — SODIUM SULFATE, POTASSIUM SULFATE AND MAGNESIUM SULFATE 1.6; 3.13; 17.5 G/177ML; G/177ML; G/177ML
17.5-3.13-1.6 SOLUTION ORAL
Qty: 2 | Refills: 0 | Status: ACTIVE | COMMUNITY
Start: 2024-07-16 | End: 1900-01-01

## 2024-07-16 RX ORDER — PANTOPRAZOLE 40 MG/1
40 TABLET, DELAYED RELEASE ORAL DAILY
Qty: 30 | Refills: 4 | Status: ACTIVE | COMMUNITY
Start: 2024-07-16 | End: 1900-01-01

## 2024-07-17 ENCOUNTER — APPOINTMENT (OUTPATIENT)
Dept: ORTHOPEDIC SURGERY | Facility: CLINIC | Age: 44
End: 2024-07-17
Payer: OTHER MISCELLANEOUS

## 2024-07-17 VITALS
DIASTOLIC BLOOD PRESSURE: 82 MMHG | WEIGHT: 127 LBS | BODY MASS INDEX: 23.37 KG/M2 | HEIGHT: 62 IN | SYSTOLIC BLOOD PRESSURE: 124 MMHG | HEART RATE: 66 BPM

## 2024-07-17 DIAGNOSIS — M75.51 BURSITIS OF RIGHT SHOULDER: ICD-10-CM

## 2024-07-17 LAB
ALBUMIN SERPL ELPH-MCNC: 4.7 G/DL
ALP BLD-CCNC: 58 U/L
ALT SERPL-CCNC: 15 U/L
ANION GAP SERPL CALC-SCNC: 15 MMOL/L
AST SERPL-CCNC: 17 U/L
BASOPHILS # BLD AUTO: 0.05 K/UL
BASOPHILS NFR BLD AUTO: 0.5 %
BILIRUB SERPL-MCNC: 0.6 MG/DL
BUN SERPL-MCNC: 12 MG/DL
CALCIUM SERPL-MCNC: 9.7 MG/DL
CHLORIDE SERPL-SCNC: 104 MMOL/L
CO2 SERPL-SCNC: 24 MMOL/L
CREAT SERPL-MCNC: 0.74 MG/DL
EGFR: 103 ML/MIN/1.73M2
EOSINOPHIL # BLD AUTO: 0.2 K/UL
EOSINOPHIL NFR BLD AUTO: 2.2 %
FERRITIN SERPL-MCNC: 21 NG/ML
GLUCOSE SERPL-MCNC: 88 MG/DL
HCT VFR BLD CALC: 41.7 %
HGB BLD-MCNC: 14.1 G/DL
IMM GRANULOCYTES NFR BLD AUTO: 0.2 %
INR PPP: 1.08 RATIO
IRON SATN MFR SERPL: 37 %
IRON SERPL-MCNC: 121 UG/DL
LYMPHOCYTES # BLD AUTO: 2.38 K/UL
LYMPHOCYTES NFR BLD AUTO: 25.9 %
MAN DIFF?: NORMAL
MCHC RBC-ENTMCNC: 30 PG
MCHC RBC-ENTMCNC: 33.8 GM/DL
MCV RBC AUTO: 88.7 FL
MONOCYTES # BLD AUTO: 0.37 K/UL
MONOCYTES NFR BLD AUTO: 4 %
NEUTROPHILS # BLD AUTO: 6.17 K/UL
NEUTROPHILS NFR BLD AUTO: 67.2 %
PLATELET # BLD AUTO: 464 K/UL
POTASSIUM SERPL-SCNC: 4.5 MMOL/L
PROT SERPL-MCNC: 7.2 G/DL
PT BLD: 12.2 SEC
RBC # BLD: 4.7 M/UL
RBC # FLD: 13.2 %
SODIUM SERPL-SCNC: 143 MMOL/L
TIBC SERPL-MCNC: 327 UG/DL
UIBC SERPL-MCNC: 206 UG/DL
WBC # FLD AUTO: 9.19 K/UL

## 2024-07-17 PROCEDURE — 99213 OFFICE O/P EST LOW 20 MIN: CPT

## 2024-07-18 LAB — IGA SER QL IEP: 163 MG/DL

## 2024-07-22 LAB
TTG IGA SER IA-ACNC: <0.5 U/ML
TTG IGA SER-ACNC: NEGATIVE
TTG IGG SER IA-ACNC: <0.8 U/ML
TTG IGG SER IA-ACNC: NEGATIVE

## 2024-08-19 ENCOUNTER — TRANSCRIPTION ENCOUNTER (OUTPATIENT)
Age: 44
End: 2024-08-19

## 2024-08-19 ENCOUNTER — RESULT REVIEW (OUTPATIENT)
Age: 44
End: 2024-08-19

## 2024-08-19 ENCOUNTER — OUTPATIENT (OUTPATIENT)
Dept: OUTPATIENT SERVICES | Facility: HOSPITAL | Age: 44
LOS: 1 days | End: 2024-08-19
Payer: COMMERCIAL

## 2024-08-19 ENCOUNTER — APPOINTMENT (OUTPATIENT)
Dept: GASTROENTEROLOGY | Facility: HOSPITAL | Age: 44
End: 2024-08-19

## 2024-08-19 DIAGNOSIS — R13.19 OTHER DYSPHAGIA: ICD-10-CM

## 2024-08-19 DIAGNOSIS — R79.0 ABNORMAL LEVEL OF BLOOD MINERAL: ICD-10-CM

## 2024-08-19 DIAGNOSIS — R79.0 ABNORMAL LVL OF BLOOD MINERAL: ICD-10-CM

## 2024-08-19 DIAGNOSIS — Z98.890 OTHER SPECIFIED POSTPROCEDURAL STATES: Chronic | ICD-10-CM

## 2024-08-19 PROCEDURE — 88305 TISSUE EXAM BY PATHOLOGIST: CPT | Mod: 26

## 2024-08-19 PROCEDURE — 45378 DIAGNOSTIC COLONOSCOPY: CPT

## 2024-08-19 PROCEDURE — 88342 IMHCHEM/IMCYTCHM 1ST ANTB: CPT

## 2024-08-19 PROCEDURE — 88305 TISSUE EXAM BY PATHOLOGIST: CPT

## 2024-08-19 PROCEDURE — 43239 EGD BIOPSY SINGLE/MULTIPLE: CPT

## 2024-08-19 PROCEDURE — 88342 IMHCHEM/IMCYTCHM 1ST ANTB: CPT | Mod: 26

## 2024-08-19 PROCEDURE — 43239 EGD BIOPSY SINGLE/MULTIPLE: CPT | Mod: 59

## 2024-08-19 DEVICE — NAIL OSTEO 1.5X16MM STRL: Type: IMPLANTABLE DEVICE | Status: FUNCTIONAL

## 2024-08-19 NOTE — PHYSICAL EXAM
[Alert] : alert [Normal Voice/Communication] : normal voice/communication [Healthy Appearing] : healthy appearing [No Acute Distress] : no acute distress [Sclera] : the sclera and conjunctiva were normal [Hearing Threshold Finger Rub Not [place]] : hearing was normal [Normal Lips/Gums] : the lips and gums were normal [Oropharynx] : the oropharynx was normal [Normal Appearance] : the appearance of the neck was normal [No Neck Mass] : no neck mass was observed [No Respiratory Distress] : no respiratory distress [No Acc Muscle Use] : no accessory muscle use [Respiration, Rhythm And Depth] : normal respiratory rhythm and effort [Auscultation Breath Sounds / Voice Sounds] : lungs were clear to auscultation bilaterally [Heart Rate And Rhythm] : heart rate was normal and rhythm regular [Normal S1, S2] : normal S1 and S2 [Murmurs] : no murmurs [Bowel Sounds] : normal bowel sounds [Abdomen Tenderness] : non-tender [No Masses] : no abdominal mass palpated [Abdomen Soft] : soft [] : no hepatosplenomegaly [Oriented To Time, Place, And Person] : oriented to person, place, and time

## 2024-08-21 LAB — SURGICAL PATHOLOGY STUDY: SIGNIFICANT CHANGE UP

## 2024-08-24 DIAGNOSIS — B96.81 GASTRITIS, UNSPECIFIED, W/OUT BLEEDING: ICD-10-CM

## 2024-08-24 DIAGNOSIS — K29.70 GASTRITIS, UNSPECIFIED, W/OUT BLEEDING: ICD-10-CM

## 2024-08-24 DIAGNOSIS — K20.0 EOSINOPHILIC ESOPHAGITIS: ICD-10-CM

## 2024-08-24 RX ORDER — PANTOPRAZOLE 40 MG/1
40 TABLET, DELAYED RELEASE ORAL
Qty: 60 | Refills: 4 | Status: ACTIVE | COMMUNITY
Start: 2024-08-24 | End: 1900-01-01

## 2024-08-24 RX ORDER — OMEPRAZOLE MAGNESIUM, AMOXICILLIN AND RIFABUTIN 10; 250; 12.5 MG/1; MG/1; MG/1
250-12.5-1 CAPSULE, DELAYED RELEASE ORAL
Qty: 168 | Refills: 0 | Status: ACTIVE | COMMUNITY
Start: 2024-08-24 | End: 1900-01-01

## 2024-08-26 ENCOUNTER — NON-APPOINTMENT (OUTPATIENT)
Age: 44
End: 2024-08-26

## 2025-01-06 NOTE — ASU DISCHARGE PLAN (ADULT/PEDIATRIC) - CARE PROVIDER_API CALL
----- Message from Med Assistant Lin sent at 1/6/2025  1:48 PM CST -----  Patient states that since starting labetalol she has been SOB and fatigued. Please advise  ----- Message -----  From: Sameer Matthew  Sent: 1/6/2025  11:58 AM CST  To: [facility] Staff    .Type:  Needs Medical Advice    Who Called: .Paola Wood  Symptoms (please be specific): reaction to medication, shortness a  How long has patient had these symptoms:    Pharmacy name and phone #:    Would the patient rather a call back or a response via [facility]? Call back  Best Call Back Number: .[phone]  Additional Information:The beta blocker   Doug Dupree  SURGERY  [address]  [place] [address]  Phone: [phone]  Fax: [phone]  Follow Up Time:

## (undated) DEVICE — BITE BLOCK ADULT 20 X 27MM (GREEN)

## (undated) DEVICE — SOL IRR BAG H2O 1000ML

## (undated) DEVICE — TUBING ALARIS PUMP MODULE NON-DEHP

## (undated) DEVICE — SYR SLIP 10CC

## (undated) DEVICE — SYR IV FLUSH SALINE 10ML 30/TY

## (undated) DEVICE — KIT DEFENDO 4 OLY 4 PC

## (undated) DEVICE — PACK IV START WITH CHG

## (undated) DEVICE — DRSG CURITY GAUZE SPONGE 4 X 4" 12-PLY NON-STERILE

## (undated) DEVICE — GOWN IMPERV XL

## (undated) DEVICE — DENTURE CUP PINK

## (undated) DEVICE — WARMING BLANKET FULL ADULT

## (undated) DEVICE — SOL IRR BAG NS 0.9% 1000ML

## (undated) DEVICE — SOL BAG NS 0.9% 1000ML

## (undated) DEVICE — VENODYNE/SCD SLEEVE CALF MEDIUM

## (undated) DEVICE — UNDERPAD LINEN SAVER 23 X 36"

## (undated) DEVICE — MASK PROCEED EARLOOP LVL 2 50/BX

## (undated) DEVICE — SENSOR O2 FINGER ADULT

## (undated) DEVICE — SYR LUER SLIP TIP 50CC

## (undated) DEVICE — FORCEP RADIAL JAW 4 W NDL 2.4MM 2.8MM 240CM ORANGE DISP

## (undated) DEVICE — DRSG 2X2

## (undated) DEVICE — CATH IV SAFE BC 22G X 1" (BLUE)

## (undated) DEVICE — TUBING IV EXTENSION MACRO W CLAVE 7"